# Patient Record
Sex: FEMALE | Race: WHITE | URBAN - METROPOLITAN AREA
[De-identification: names, ages, dates, MRNs, and addresses within clinical notes are randomized per-mention and may not be internally consistent; named-entity substitution may affect disease eponyms.]

---

## 2022-09-08 ENCOUNTER — OFFICE (OUTPATIENT)
Dept: URBAN - METROPOLITAN AREA CLINIC 79 | Facility: CLINIC | Age: 62
End: 2022-09-08

## 2022-09-08 VITALS
DIASTOLIC BLOOD PRESSURE: 98 MMHG | SYSTOLIC BLOOD PRESSURE: 132 MMHG | TEMPERATURE: 97.2 F | HEIGHT: 62 IN | HEART RATE: 74 BPM | WEIGHT: 178 LBS

## 2022-09-08 DIAGNOSIS — K21.9 GASTRO-ESOPHAGEAL REFLUX DISEASE WITHOUT ESOPHAGITIS: ICD-10-CM

## 2022-09-08 DIAGNOSIS — R07.89 OTHER CHEST PAIN: ICD-10-CM

## 2022-09-08 DIAGNOSIS — I50.9 HEART FAILURE, UNSPECIFIED: ICD-10-CM

## 2022-09-08 DIAGNOSIS — I67.89 OTHER CEREBROVASCULAR DISEASE: ICD-10-CM

## 2022-09-08 PROCEDURE — 99204 OFFICE O/P NEW MOD 45 MIN: CPT | Performed by: PHYSICIAN ASSISTANT

## 2022-09-08 NOTE — SERVICEHPINOTES
CITLALLI SEWELL   is a   62   year old    white female who is being seen in consultation at the request of   [facility]   for "chest pain." She reports chest pains, "sharp," lasting up to 5 minutes in duration, episodes 1-2x a day, no worse with po intake: Began in 01/2022. She notes asso symptom includes shortness of breath, "room spinning" sensation and needs to lay down to let it pass. She informs of atypical chest pain that led to cardiologist at [facility] cardiology group in 07/2022 which advised GI consult (requesting records). Last EGD in 2012 per patient report. She is on PPI Nexium 40 mg qd for many years. On rx Plavix and ASA 81 mg qd. Denies n/v, dysphagia, belching, abdominal pain, melena, weight loss. smita ordoñez
She resides at [facility] that sent patient via transportation services to our office today, left alone in the waiting room who is wheel chair bound with a broken foot causing limited mobility requiring assistance to ambulate. Reviewed 08/29/2022 XR left foot fracture noted.smita ordoñez